# Patient Record
Sex: MALE | Race: WHITE
[De-identification: names, ages, dates, MRNs, and addresses within clinical notes are randomized per-mention and may not be internally consistent; named-entity substitution may affect disease eponyms.]

---

## 2018-03-13 ENCOUNTER — HOSPITAL ENCOUNTER (OUTPATIENT)
Age: 38
End: 2018-03-13
Payer: COMMERCIAL

## 2018-03-13 DIAGNOSIS — G35: Primary | ICD-10-CM

## 2018-03-13 DIAGNOSIS — Z79.899: ICD-10-CM

## 2018-03-13 LAB
ALANINE AMINOTRANSFER ALT/SGPT: 38 U/L (ref 16–61)
ALBUMIN SERPL-MCNC: 3.7 G/DL (ref 3.2–5)
ALKALINE PHOSPHATASE: 57 U/L (ref 45–117)
ANION GAP: 7 (ref 5–15)
AST(SGOT): 25 U/L (ref 15–37)
BUN SERPL-MCNC: 9 MG/DL (ref 7–18)
BUN/CREAT RATIO: 10.2 RATIO (ref 10–20)
CALCIUM SERPL-MCNC: 8.7 MG/DL (ref 8.5–10.1)
CARBON DIOXIDE: 29 MMOL/L (ref 21–32)
CHLORIDE: 103 MMOL/L (ref 98–107)
DEPRECATED RDW RBC: 44.4 FL (ref 35.1–43.9)
DIFFERENTIAL INDICATED: (no result)
ERYTHROCYTE [DISTWIDTH] IN BLOOD: 12.9 % (ref 11.6–14.6)
EST GLOM FILT RATE - AFR AMER: 125 ML/MIN (ref 60–?)
GLOBULIN: 3.4 G/DL (ref 2.2–4.2)
GLUCOSE: 88 MG/DL (ref 74–106)
HCT VFR BLD AUTO: 42.8 % (ref 40–54)
HEMOGLOBIN: 14.4 G/DL (ref 13–16.5)
HGB BLD-MCNC: 14.4 G/DL (ref 13–16.5)
IMMATURE GRANULOCYTES COUNT: 0 X10^3/UL (ref 0–0)
MCV RBC: 96.4 FL (ref 80–94)
MEAN CORP HGB CONC: 33.6 G/GL (ref 32–36)
MEAN PLATELET VOL.: 9.5 FL (ref 6.2–12)
PLATELET # BLD: 302 K/MM3 (ref 150–450)
PLATELET COUNT: 302 K/MM3 (ref 150–450)
POSITIVE COUNT: NO
POSITIVE DIFFERENTIAL: NO
POSITIVE MORPHOLOGY: NO
POTASSIUM: 3.8 MMOL/L (ref 3.5–5.1)
RBC # BLD AUTO: 4.44 M/MM3 (ref 4.6–6.2)
RBC DISTRIBUTION WIDTH CV: 12.9 % (ref 11.6–14.6)
RBC DISTRIBUTION WIDTH SD: 44.4 FL (ref 35.1–43.9)
WBC # BLD AUTO: 6.3 K/MM3 (ref 4.4–11)
WHITE BLOOD COUNT: 6.3 K/MM3 (ref 4.4–11)

## 2018-03-13 PROCEDURE — 70553 MRI BRAIN STEM W/O & W/DYE: CPT

## 2018-03-13 PROCEDURE — 85025 COMPLETE CBC W/AUTO DIFF WBC: CPT

## 2018-03-13 PROCEDURE — A9585 GADOBUTROL INJECTION: HCPCS

## 2018-03-13 PROCEDURE — 36415 COLL VENOUS BLD VENIPUNCTURE: CPT

## 2018-03-13 PROCEDURE — 80053 COMPREHEN METABOLIC PANEL: CPT

## 2019-05-02 ENCOUNTER — HOSPITAL ENCOUNTER (OUTPATIENT)
Dept: HOSPITAL 100 - LAB | Age: 39
Discharge: HOME | End: 2019-05-02
Payer: COMMERCIAL

## 2019-05-02 VITALS — BODY MASS INDEX: 24.7 KG/M2

## 2019-05-02 DIAGNOSIS — G35: Primary | ICD-10-CM

## 2019-05-02 LAB — POTASSIUM: 3.8 MMOL/L (ref 3.5–5.1)

## 2019-05-02 PROCEDURE — 84132 ASSAY OF SERUM POTASSIUM: CPT

## 2019-05-02 PROCEDURE — 36415 COLL VENOUS BLD VENIPUNCTURE: CPT

## 2020-07-15 ENCOUNTER — HOSPITAL ENCOUNTER (OUTPATIENT)
Dept: HOSPITAL 100 - LAB | Age: 40
Discharge: HOME | End: 2020-07-15
Payer: COMMERCIAL

## 2020-07-15 DIAGNOSIS — Z79.899: Primary | ICD-10-CM

## 2020-07-15 LAB
ALANINE AMINOTRANSFER ALT/SGPT: 34 U/L (ref 16–61)
ALBUMIN SERPL-MCNC: 3.8 G/DL (ref 3.2–5)
ALKALINE PHOSPHATASE: 52 U/L (ref 45–117)
ANION GAP: 6 (ref 5–15)
AST(SGOT): 18 U/L (ref 15–37)
BUN SERPL-MCNC: 10 MG/DL (ref 7–18)
BUN/CREAT RATIO: 10.5 RATIO (ref 10–20)
CALCIUM SERPL-MCNC: 8.7 MG/DL (ref 8.5–10.1)
CARBON DIOXIDE: 28 MMOL/L (ref 21–32)
CHLORIDE: 104 MMOL/L (ref 98–107)
DEPRECATED RDW RBC: 41.7 FL (ref 35.1–43.9)
ERYTHROCYTE [DISTWIDTH] IN BLOOD: 12.4 % (ref 11.6–14.6)
EST GLOM FILT RATE - AFR AMER: 113 ML/MIN (ref 60–?)
GLOBULIN: 3.3 G/DL (ref 2.2–4.2)
GLUCOSE: 96 MG/DL (ref 74–106)
HCT VFR BLD AUTO: 44 % (ref 40–54)
HEMOGLOBIN: 15.2 G/DL (ref 13–16.5)
HGB BLD-MCNC: 15.2 G/DL (ref 13–16.5)
IMMATURE GRANULOCYTES COUNT: 0.01 X10^3/UL (ref 0–0)
MCV RBC: 91.9 FL (ref 80–94)
MEAN CORP HGB CONC: 34.5 G/DL (ref 32–36)
MEAN PLATELET VOL.: 9.9 FL (ref 6.2–12)
NRBC FLAGGED BY ANALYZER: 0 % (ref 0–5)
PLATELET # BLD: 337 K/MM3 (ref 150–450)
PLATELET COUNT: 337 K/MM3 (ref 150–450)
POTASSIUM: 3.7 MMOL/L (ref 3.5–5.1)
RBC # BLD AUTO: 4.79 M/MM3 (ref 4.6–6.2)
RBC DISTRIBUTION WIDTH CV: 12.4 % (ref 11.6–14.6)
RBC DISTRIBUTION WIDTH SD: 41.7 FL (ref 35.1–43.9)
WBC # BLD AUTO: 7.2 K/MM3 (ref 4.4–11)
WHITE BLOOD COUNT: 7.2 K/MM3 (ref 4.4–11)

## 2020-07-15 PROCEDURE — 80053 COMPREHEN METABOLIC PANEL: CPT

## 2020-07-15 PROCEDURE — 36415 COLL VENOUS BLD VENIPUNCTURE: CPT

## 2020-07-15 PROCEDURE — 85025 COMPLETE CBC W/AUTO DIFF WBC: CPT

## 2020-09-21 ENCOUNTER — HOSPITAL ENCOUNTER (OUTPATIENT)
Dept: HOSPITAL 100 - LAB | Age: 40
Discharge: HOME | End: 2020-09-21
Payer: COMMERCIAL

## 2020-09-21 VITALS — BODY MASS INDEX: 24.7 KG/M2

## 2020-09-21 DIAGNOSIS — Z79.899: Primary | ICD-10-CM

## 2020-09-21 LAB
ALANINE AMINOTRANSFER ALT/SGPT: 32 U/L (ref 16–61)
ALBUMIN SERPL-MCNC: 3.8 G/DL (ref 3.2–5)
ALKALINE PHOSPHATASE: 51 U/L (ref 45–117)
ANION GAP: 5 (ref 5–15)
AST(SGOT): 20 U/L (ref 15–37)
BUN SERPL-MCNC: 10 MG/DL (ref 7–18)
BUN/CREAT RATIO: 11 RATIO (ref 10–20)
CALCIUM SERPL-MCNC: 9 MG/DL (ref 8.5–10.1)
CARBON DIOXIDE: 29 MMOL/L (ref 21–32)
CHLORIDE: 105 MMOL/L (ref 98–107)
DEPRECATED RDW RBC: 41.9 FL (ref 35.1–43.9)
ERYTHROCYTE [DISTWIDTH] IN BLOOD: 12.1 % (ref 11.6–14.6)
EST GLOM FILT RATE - AFR AMER: 119 ML/MIN (ref 60–?)
GLOBULIN: 3.4 G/DL (ref 2.2–4.2)
GLUCOSE: 100 MG/DL (ref 74–106)
HCT VFR BLD AUTO: 44.4 % (ref 40–54)
HEMOGLOBIN: 15 G/DL (ref 13–16.5)
HGB BLD-MCNC: 15 G/DL (ref 13–16.5)
IMMATURE GRANULOCYTES COUNT: 0.02 X10^3/UL (ref 0–0)
MCV RBC: 93.3 FL (ref 80–94)
MEAN CORP HGB CONC: 33.8 G/DL (ref 32–36)
MEAN PLATELET VOL.: 10.1 FL (ref 6.2–12)
NRBC FLAGGED BY ANALYZER: 0 % (ref 0–5)
PLATELET # BLD: 336 K/MM3 (ref 150–450)
PLATELET COUNT: 336 K/MM3 (ref 150–450)
POTASSIUM: 3.6 MMOL/L (ref 3.5–5.1)
RBC # BLD AUTO: 4.76 M/MM3 (ref 4.6–6.2)
RBC DISTRIBUTION WIDTH CV: 12.1 % (ref 11.6–14.6)
RBC DISTRIBUTION WIDTH SD: 41.9 FL (ref 35.1–43.9)
WBC # BLD AUTO: 7.1 K/MM3 (ref 4.4–11)
WHITE BLOOD COUNT: 7.1 K/MM3 (ref 4.4–11)

## 2020-09-21 PROCEDURE — 36415 COLL VENOUS BLD VENIPUNCTURE: CPT

## 2020-09-21 PROCEDURE — 85025 COMPLETE CBC W/AUTO DIFF WBC: CPT

## 2020-09-21 PROCEDURE — 80053 COMPREHEN METABOLIC PANEL: CPT

## 2021-03-11 ENCOUNTER — HOSPITAL ENCOUNTER (OUTPATIENT)
Age: 41
End: 2021-03-11
Payer: COMMERCIAL

## 2021-03-11 VITALS — BODY MASS INDEX: 24.7 KG/M2

## 2021-03-11 DIAGNOSIS — Z79.899: Primary | ICD-10-CM

## 2021-03-11 LAB
ALANINE AMINOTRANSFER ALT/SGPT: 30 U/L (ref 16–61)
ALBUMIN SERPL-MCNC: 3.9 G/DL (ref 3.2–5)
ALKALINE PHOSPHATASE: 57 U/L (ref 45–117)
ANION GAP: 8 (ref 5–15)
AST(SGOT): 22 U/L (ref 15–37)
BUN SERPL-MCNC: 12 MG/DL (ref 7–18)
BUN/CREAT RATIO: 12.6 RATIO (ref 10–20)
CALCIUM SERPL-MCNC: 9.2 MG/DL (ref 8.5–10.1)
CARBON DIOXIDE: 28 MMOL/L (ref 21–32)
CHLORIDE: 104 MMOL/L (ref 98–107)
DEPRECATED RDW RBC: 43 FL (ref 35.1–43.9)
ERYTHROCYTE [DISTWIDTH] IN BLOOD: 12.5 % (ref 11.6–14.6)
EST GLOM FILT RATE - AFR AMER: 111 ML/MIN (ref 60–?)
GLOBULIN: 3.3 G/DL (ref 2.2–4.2)
GLUCOSE: 80 MG/DL (ref 74–106)
HCT VFR BLD AUTO: 43.8 % (ref 40–54)
HEMOGLOBIN: 15.1 G/DL (ref 13–16.5)
HGB BLD-MCNC: 15.1 G/DL (ref 13–16.5)
IMMATURE GRANULOCYTES COUNT: 0.02 X10^3/UL (ref 0–0)
MCV RBC: 92.8 FL (ref 80–94)
MEAN CORP HGB CONC: 34.5 G/DL (ref 32–36)
MEAN PLATELET VOL.: 10.2 FL (ref 6.2–12)
NRBC FLAGGED BY ANALYZER: 0 % (ref 0–5)
PLATELET # BLD: 348 K/MM3 (ref 150–450)
PLATELET COUNT: 348 K/MM3 (ref 150–450)
POTASSIUM: 3.6 MMOL/L (ref 3.5–5.1)
RBC # BLD AUTO: 4.72 M/MM3 (ref 4.6–6.2)
RBC DISTRIBUTION WIDTH CV: 12.5 % (ref 11.6–14.6)
RBC DISTRIBUTION WIDTH SD: 43 FL (ref 35.1–43.9)
WBC # BLD AUTO: 7.3 K/MM3 (ref 4.4–11)
WHITE BLOOD COUNT: 7.3 K/MM3 (ref 4.4–11)

## 2021-03-11 PROCEDURE — 82784 ASSAY IGA/IGD/IGG/IGM EACH: CPT

## 2021-03-11 PROCEDURE — 82785 ASSAY OF IGE: CPT

## 2021-03-11 PROCEDURE — 85025 COMPLETE CBC W/AUTO DIFF WBC: CPT

## 2021-03-11 PROCEDURE — 36415 COLL VENOUS BLD VENIPUNCTURE: CPT

## 2021-03-11 PROCEDURE — 80053 COMPREHEN METABOLIC PANEL: CPT

## 2021-03-15 LAB
IGA SERPL-MCNC: 123 MG/DL (ref 90–386)
IGG SERPL-MCNC: 979 MG/DL (ref 603–1613)
IGM SERPL-MCNC: 127 MG/DL (ref 20–172)
IMMUNOGLOBULIN A: 123 MG/DL (ref 90–386)
IMMUNOGLOBULIN G: 979 MG/DL (ref 603–1613)
IMMUNOGLOBULIN M: 127 MG/DL (ref 20–172)

## 2021-08-30 ENCOUNTER — HOSPITAL ENCOUNTER (OUTPATIENT)
Age: 41
End: 2021-08-30
Payer: COMMERCIAL

## 2021-08-30 DIAGNOSIS — Z79.899: Primary | ICD-10-CM

## 2021-08-30 LAB
ALANINE AMINOTRANSFER ALT/SGPT: 29 U/L (ref 16–61)
ALBUMIN SERPL-MCNC: 4 G/DL (ref 3.2–5)
ALKALINE PHOSPHATASE: 51 U/L (ref 45–117)
ANION GAP: 8 (ref 5–15)
AST(SGOT): 17 U/L (ref 15–37)
BUN SERPL-MCNC: 13 MG/DL (ref 7–18)
BUN/CREAT RATIO: 14 RATIO (ref 10–20)
CALCIUM SERPL-MCNC: 9.3 MG/DL (ref 8.5–10.1)
CARBON DIOXIDE: 25 MMOL/L (ref 21–32)
CHLORIDE: 108 MMOL/L (ref 98–107)
DEPRECATED RDW RBC: 43.7 FL (ref 35.1–43.9)
ERYTHROCYTE [DISTWIDTH] IN BLOOD: 12.7 % (ref 11.6–14.6)
EST GLOM FILT RATE - AFR AMER: 115 ML/MIN (ref 60–?)
GLOBULIN: 3.2 G/DL (ref 2.2–4.2)
GLUCOSE: 95 MG/DL (ref 74–106)
HCT VFR BLD AUTO: 43.7 % (ref 40–54)
HEMOGLOBIN: 14.6 G/DL (ref 13–16.5)
HGB BLD-MCNC: 14.6 G/DL (ref 13–16.5)
IMMATURE GRANULOCYTES COUNT: 0.02 X10^3/UL (ref 0–0)
MCV RBC: 93.8 FL (ref 80–94)
MEAN CORP HGB CONC: 33.4 G/DL (ref 32–36)
MEAN PLATELET VOL.: 10.1 FL (ref 6.2–12)
NRBC FLAGGED BY ANALYZER: 0 % (ref 0–5)
PLATELET # BLD: 348 K/MM3 (ref 150–450)
PLATELET COUNT: 348 K/MM3 (ref 150–450)
POTASSIUM: 3.6 MMOL/L (ref 3.5–5.1)
RBC # BLD AUTO: 4.66 M/MM3 (ref 4.6–6.2)
RBC DISTRIBUTION WIDTH CV: 12.7 % (ref 11.6–14.6)
RBC DISTRIBUTION WIDTH SD: 43.7 FL (ref 35.1–43.9)
WBC # BLD AUTO: 7.4 K/MM3 (ref 4.4–11)
WHITE BLOOD COUNT: 7.4 K/MM3 (ref 4.4–11)

## 2021-08-30 PROCEDURE — 85025 COMPLETE CBC W/AUTO DIFF WBC: CPT

## 2021-08-30 PROCEDURE — 80053 COMPREHEN METABOLIC PANEL: CPT

## 2021-08-30 PROCEDURE — 82784 ASSAY IGA/IGD/IGG/IGM EACH: CPT

## 2021-08-30 PROCEDURE — 36415 COLL VENOUS BLD VENIPUNCTURE: CPT

## 2021-09-01 LAB
IGA SERPL-MCNC: 121 MG/DL (ref 90–386)
IGG SERPL-MCNC: 983 MG/DL (ref 603–1613)
IGM SERPL-MCNC: 115 MG/DL (ref 20–172)
IMMUNOGLOBULIN A: 121 MG/DL (ref 90–386)
IMMUNOGLOBULIN G: 983 MG/DL (ref 603–1613)
IMMUNOGLOBULIN M: 115 MG/DL (ref 20–172)

## 2022-02-14 ENCOUNTER — HOSPITAL ENCOUNTER (OUTPATIENT)
Dept: HOSPITAL 100 - LAB | Age: 42
Discharge: HOME | End: 2022-02-14
Payer: COMMERCIAL

## 2022-02-14 DIAGNOSIS — Z79.899: Primary | ICD-10-CM

## 2022-02-14 LAB
ALANINE AMINOTRANSFER ALT/SGPT: 24 U/L (ref 16–61)
ALBUMIN SERPL-MCNC: 3.9 G/DL (ref 3.2–5)
ALKALINE PHOSPHATASE: 59 U/L (ref 45–117)
ANION GAP: 4 (ref 5–15)
AST(SGOT): 16 U/L (ref 15–37)
BUN SERPL-MCNC: 13 MG/DL (ref 7–18)
BUN/CREAT RATIO: 12.6 RATIO (ref 10–20)
CALCIUM SERPL-MCNC: 9.2 MG/DL (ref 8.5–10.1)
CARBON DIOXIDE: 29 MMOL/L (ref 21–32)
CHLORIDE: 106 MMOL/L (ref 98–107)
DEPRECATED RDW RBC: 43.9 FL (ref 35.1–43.9)
ERYTHROCYTE [DISTWIDTH] IN BLOOD: 12.8 % (ref 11.6–14.6)
EST GLOM FILT RATE - AFR AMER: 102 ML/MIN (ref 60–?)
GLOBULIN: 3.3 G/DL (ref 2.2–4.2)
GLUCOSE: 102 MG/DL (ref 74–106)
HCT VFR BLD AUTO: 43.4 % (ref 40–54)
HEMOGLOBIN: 15 G/DL (ref 13–16.5)
HGB BLD-MCNC: 15 G/DL (ref 13–16.5)
IMMATURE GRANULOCYTES COUNT: 0.03 X10^3/UL (ref 0–0)
MCV RBC: 92.7 FL (ref 80–94)
MEAN CORP HGB CONC: 34.6 G/DL (ref 32–36)
MEAN PLATELET VOL.: 10 FL (ref 6.2–12)
NRBC FLAGGED BY ANALYZER: 0 % (ref 0–5)
PLATELET # BLD: 341 K/MM3 (ref 150–450)
PLATELET COUNT: 341 K/MM3 (ref 150–450)
POTASSIUM: 3.8 MMOL/L (ref 3.5–5.1)
RBC # BLD AUTO: 4.68 M/MM3 (ref 4.6–6.2)
RBC DISTRIBUTION WIDTH CV: 12.8 % (ref 11.6–14.6)
RBC DISTRIBUTION WIDTH SD: 43.9 FL (ref 35.1–43.9)
WBC # BLD AUTO: 7.8 K/MM3 (ref 4.4–11)
WHITE BLOOD COUNT: 7.8 K/MM3 (ref 4.4–11)

## 2022-02-14 PROCEDURE — 36415 COLL VENOUS BLD VENIPUNCTURE: CPT

## 2022-02-14 PROCEDURE — 80053 COMPREHEN METABOLIC PANEL: CPT

## 2022-02-14 PROCEDURE — 82784 ASSAY IGA/IGD/IGG/IGM EACH: CPT

## 2022-02-14 PROCEDURE — 85025 COMPLETE CBC W/AUTO DIFF WBC: CPT

## 2022-02-16 LAB
IGA SERPL-MCNC: 126 MG/DL (ref 90–386)
IGG SERPL-MCNC: 963 MG/DL (ref 603–1613)
IGM SERPL-MCNC: 116 MG/DL (ref 20–172)
IMMUNOGLOBULIN A: 126 MG/DL (ref 90–386)
IMMUNOGLOBULIN G: 963 MG/DL (ref 603–1613)
IMMUNOGLOBULIN M: 116 MG/DL (ref 20–172)

## 2022-08-11 ENCOUNTER — HOSPITAL ENCOUNTER (OUTPATIENT)
Dept: HOSPITAL 100 - LAB | Age: 42
Discharge: HOME | End: 2022-08-11
Payer: COMMERCIAL

## 2022-08-11 DIAGNOSIS — Z79.899: Primary | ICD-10-CM

## 2022-08-11 LAB
ALANINE AMINOTRANSFER ALT/SGPT: 27 U/L (ref 16–61)
ALBUMIN SERPL-MCNC: 4 G/DL (ref 3.2–5)
ALKALINE PHOSPHATASE: 50 U/L (ref 45–117)
ANION GAP: 7 (ref 5–15)
AST(SGOT): 20 U/L (ref 15–37)
BUN SERPL-MCNC: 18 MG/DL (ref 7–18)
BUN/CREAT RATIO: 17.5 RATIO (ref 10–20)
CALCIUM SERPL-MCNC: 9.5 MG/DL (ref 8.5–10.1)
CARBON DIOXIDE: 27 MMOL/L (ref 21–32)
CHLORIDE: 104 MMOL/L (ref 98–107)
DEPRECATED RDW RBC: 43.5 FL (ref 35.1–43.9)
ERYTHROCYTE [DISTWIDTH] IN BLOOD: 12.6 % (ref 11.6–14.6)
EST GLOM FILT RATE - AFR AMER: 101 ML/MIN (ref 60–?)
GLOBULIN: 3 G/DL (ref 2.2–4.2)
GLUCOSE: 91 MG/DL (ref 74–106)
HCT VFR BLD AUTO: 43.8 % (ref 40–54)
HEMOGLOBIN: 14.9 G/DL (ref 13–16.5)
HGB BLD-MCNC: 14.9 G/DL (ref 13–16.5)
IMMATURE GRANULOCYTES COUNT: 0.02 X10^3/UL (ref 0–0)
MCV RBC: 93.6 FL (ref 80–94)
MEAN CORP HGB CONC: 34 G/DL (ref 32–36)
MEAN PLATELET VOL.: 10.1 FL (ref 6.2–12)
NRBC FLAGGED BY ANALYZER: 0 % (ref 0–5)
PLATELET # BLD: 318 K/MM3 (ref 150–450)
PLATELET COUNT: 318 K/MM3 (ref 150–450)
POTASSIUM: 3.8 MMOL/L (ref 3.5–5.1)
RBC # BLD AUTO: 4.68 M/MM3 (ref 4.6–6.2)
RBC DISTRIBUTION WIDTH CV: 12.6 % (ref 11.6–14.6)
RBC DISTRIBUTION WIDTH SD: 43.5 FL (ref 35.1–43.9)
WBC # BLD AUTO: 7.4 K/MM3 (ref 4.4–11)
WHITE BLOOD COUNT: 7.4 K/MM3 (ref 4.4–11)

## 2022-08-11 PROCEDURE — 80053 COMPREHEN METABOLIC PANEL: CPT

## 2022-08-11 PROCEDURE — 85025 COMPLETE CBC W/AUTO DIFF WBC: CPT

## 2022-08-11 PROCEDURE — 82784 ASSAY IGA/IGD/IGG/IGM EACH: CPT

## 2022-08-11 PROCEDURE — 36415 COLL VENOUS BLD VENIPUNCTURE: CPT

## 2022-08-13 LAB
IGA SERPL-MCNC: 124 MG/DL (ref 90–386)
IGG SERPL-MCNC: 967 MG/DL (ref 603–1613)
IGM SERPL-MCNC: 116 MG/DL (ref 20–172)
IMMUNOGLOBULIN A: 124 MG/DL (ref 90–386)
IMMUNOGLOBULIN G: 967 MG/DL (ref 603–1613)
IMMUNOGLOBULIN M: 116 MG/DL (ref 20–172)

## 2023-01-18 ENCOUNTER — HOSPITAL ENCOUNTER (OUTPATIENT)
Dept: HOSPITAL 100 - LAB | Age: 43
Discharge: HOME | End: 2023-01-18
Payer: COMMERCIAL

## 2023-01-18 DIAGNOSIS — Z79.899: Primary | ICD-10-CM

## 2023-01-18 LAB
ALANINE AMINOTRANSFER ALT/SGPT: 25 U/L (ref 16–61)
ALBUMIN SERPL-MCNC: 4.1 G/DL (ref 3.2–5)
ALKALINE PHOSPHATASE: 48 U/L (ref 45–117)
ANION GAP: 5 (ref 5–15)
AST(SGOT): 19 U/L (ref 15–37)
BUN SERPL-MCNC: 14 MG/DL (ref 7–18)
BUN/CREAT RATIO: 13.7 RATIO (ref 10–20)
CALCIUM SERPL-MCNC: 9.5 MG/DL (ref 8.5–10.1)
CARBON DIOXIDE: 30 MMOL/L (ref 21–32)
CHLORIDE: 103 MMOL/L (ref 98–107)
DEPRECATED RDW RBC: 44 FL (ref 35.1–43.9)
ERYTHROCYTE [DISTWIDTH] IN BLOOD: 12.8 % (ref 11.6–14.6)
EST GLOM FILT RATE - AFR AMER: 102 ML/MIN (ref 60–?)
GLOBULIN: 3.2 G/DL (ref 2.2–4.2)
GLUCOSE: 101 MG/DL (ref 74–106)
HCT VFR BLD AUTO: 43.4 % (ref 40–54)
HEMOGLOBIN: 14.5 G/DL (ref 13–16.5)
HGB BLD-MCNC: 14.5 G/DL (ref 13–16.5)
IMMATURE GRANULOCYTES COUNT: 0.02 X10^3/UL (ref 0–0)
MCV RBC: 93.9 FL (ref 80–94)
MEAN CORP HGB CONC: 33.4 G/DL (ref 32–36)
MEAN PLATELET VOL.: 9.4 FL (ref 6.2–12)
NRBC FLAGGED BY ANALYZER: 0 % (ref 0–5)
PLATELET # BLD: 330 K/MM3 (ref 150–450)
PLATELET COUNT: 330 K/MM3 (ref 150–450)
POTASSIUM: 4.1 MMOL/L (ref 3.5–5.1)
RBC # BLD AUTO: 4.62 M/MM3 (ref 4.6–6.2)
RBC DISTRIBUTION WIDTH CV: 12.8 % (ref 11.6–14.6)
RBC DISTRIBUTION WIDTH SD: 44 FL (ref 35.1–43.9)
WBC # BLD AUTO: 6.6 K/MM3 (ref 4.4–11)
WHITE BLOOD COUNT: 6.6 K/MM3 (ref 4.4–11)

## 2023-01-18 PROCEDURE — 80053 COMPREHEN METABOLIC PANEL: CPT

## 2023-01-18 PROCEDURE — 82784 ASSAY IGA/IGD/IGG/IGM EACH: CPT

## 2023-01-18 PROCEDURE — 36415 COLL VENOUS BLD VENIPUNCTURE: CPT

## 2023-01-18 PROCEDURE — 85025 COMPLETE CBC W/AUTO DIFF WBC: CPT

## 2023-01-20 LAB
IGA SERPL-MCNC: 122 MG/DL (ref 90–386)
IGG SERPL-MCNC: 984 MG/DL (ref 603–1613)
IGM SERPL-MCNC: 108 MG/DL (ref 20–172)
IMMUNOGLOBULIN A: 122 MG/DL (ref 90–386)
IMMUNOGLOBULIN G: 984 MG/DL (ref 603–1613)
IMMUNOGLOBULIN M: 108 MG/DL (ref 20–172)

## 2023-07-25 ENCOUNTER — HOSPITAL ENCOUNTER (OUTPATIENT)
Age: 43
Discharge: HOME | End: 2023-07-25
Payer: COMMERCIAL

## 2023-07-25 DIAGNOSIS — Z79.899: Primary | ICD-10-CM

## 2023-07-25 LAB
ALANINE AMINOTRANSFER ALT/SGPT: 22 U/L (ref 16–61)
ALBUMIN SERPL-MCNC: 4.1 G/DL (ref 3.2–5)
ALKALINE PHOSPHATASE: 52 U/L (ref 45–117)
ANION GAP: 4 (ref 5–15)
AST(SGOT): 20 U/L (ref 15–37)
BUN SERPL-MCNC: 15 MG/DL (ref 7–18)
BUN/CREAT RATIO: 13.3 RATIO (ref 10–20)
CALCIUM SERPL-MCNC: 9.4 MG/DL (ref 8.5–10.1)
CARBON DIOXIDE: 28 MMOL/L (ref 21–32)
CHLORIDE: 108 MMOL/L (ref 98–107)
DEPRECATED RDW RBC: 43.3 FL (ref 35.1–43.9)
ERYTHROCYTE [DISTWIDTH] IN BLOOD: 12.5 % (ref 11.6–14.6)
EST GLOM FILT RATE - AFR AMER: 91 ML/MIN (ref 60–?)
GLOBULIN: 3 G/DL (ref 2.2–4.2)
GLUCOSE: 96 MG/DL (ref 74–106)
HCT VFR BLD AUTO: 42.9 % (ref 40–54)
HEMOGLOBIN: 14.4 G/DL (ref 13–16.5)
HGB BLD-MCNC: 14.4 G/DL (ref 13–16.5)
IMMATURE GRANULOCYTES COUNT: 0.02 X10^3/UL (ref 0–0)
MCV RBC: 94.1 FL (ref 80–94)
MEAN CORP HGB CONC: 33.6 G/DL (ref 32–36)
MEAN PLATELET VOL.: 9.5 FL (ref 6.2–12)
NRBC FLAGGED BY ANALYZER: 0 % (ref 0–5)
PLATELET # BLD: 301 K/MM3 (ref 150–450)
PLATELET COUNT: 301 K/MM3 (ref 150–450)
POTASSIUM: 3.9 MMOL/L (ref 3.5–5.1)
RBC # BLD AUTO: 4.56 M/MM3 (ref 4.6–6.2)
RBC DISTRIBUTION WIDTH CV: 12.5 % (ref 11.6–14.6)
RBC DISTRIBUTION WIDTH SD: 43.3 FL (ref 35.1–43.9)
WBC # BLD AUTO: 7.8 K/MM3 (ref 4.4–11)
WHITE BLOOD COUNT: 7.8 K/MM3 (ref 4.4–11)

## 2023-07-25 PROCEDURE — 85025 COMPLETE CBC W/AUTO DIFF WBC: CPT

## 2023-07-25 PROCEDURE — 36415 COLL VENOUS BLD VENIPUNCTURE: CPT

## 2023-07-25 PROCEDURE — 80053 COMPREHEN METABOLIC PANEL: CPT

## 2023-07-25 PROCEDURE — 82784 ASSAY IGA/IGD/IGG/IGM EACH: CPT

## 2023-07-25 PROCEDURE — 82785 ASSAY OF IGE: CPT

## 2023-07-29 LAB
IGA SERPL-MCNC: 116 MG/DL (ref 90–386)
IGG SERPL-MCNC: 1012 MG/DL (ref 603–1613)
IGM SERPL-MCNC: 110 MG/DL (ref 20–172)
IMMUNOGLOBULIN A: 116 MG/DL (ref 90–386)
IMMUNOGLOBULIN G: 1012 MG/DL (ref 603–1613)
IMMUNOGLOBULIN M: 110 MG/DL (ref 20–172)

## 2024-01-22 ENCOUNTER — HOSPITAL ENCOUNTER (OUTPATIENT)
Age: 44
Discharge: HOME | End: 2024-01-22
Payer: COMMERCIAL

## 2024-01-22 DIAGNOSIS — Z79.899: Primary | ICD-10-CM

## 2024-01-22 LAB
ALANINE AMINOTRANSFER ALT/SGPT: 20 U/L (ref 16–61)
ALBUMIN SERPL-MCNC: 4 G/DL (ref 3.2–5)
ALKALINE PHOSPHATASE: 53 U/L (ref 45–117)
ANION GAP: 4 (ref 5–15)
AST(SGOT): 25 U/L (ref 15–37)
BUN SERPL-MCNC: 7 MG/DL (ref 7–18)
BUN/CREAT RATIO: 7.8 RATIO (ref 10–20)
CALCIUM SERPL-MCNC: 9.8 MG/DL (ref 8.5–10.1)
CARBON DIOXIDE: 30 MMOL/L (ref 21–32)
CHLORIDE: 106 MMOL/L (ref 98–107)
DEPRECATED RDW RBC: 44.3 FL (ref 35.1–43.9)
ERYTHROCYTE [DISTWIDTH] IN BLOOD: 12.8 % (ref 11.6–14.6)
EST GLOM FILT RATE - AFR AMER: 117 ML/MIN (ref 60–?)
GLOBULIN: 3 G/DL (ref 2.2–4.2)
GLUCOSE: 87 MG/DL (ref 74–106)
HCT VFR BLD AUTO: 42.4 % (ref 40–54)
HGB BLD-MCNC: 14 G/DL (ref 13–16.5)
IMMATURE GRANULOCYTES COUNT: 0.02 X10^3/UL (ref 0–0)
MCV RBC: 94.2 FL (ref 80–94)
MEAN CORP HGB CONC: 33 G/DL (ref 32–36)
MEAN PLATELET VOL.: 10.2 FL (ref 6.2–12)
NRBC FLAGGED BY ANALYZER: 0 % (ref 0–5)
PLATELET # BLD: 348 K/MM3 (ref 150–450)
POTASSIUM: 4.3 MMOL/L (ref 3.5–5.1)
RBC # BLD AUTO: 4.5 M/MM3 (ref 4.6–6.2)
WBC # BLD AUTO: 8.2 K/MM3 (ref 4.4–11)

## 2024-01-22 PROCEDURE — 80053 COMPREHEN METABOLIC PANEL: CPT

## 2024-01-22 PROCEDURE — 36415 COLL VENOUS BLD VENIPUNCTURE: CPT

## 2024-01-22 PROCEDURE — 85025 COMPLETE CBC W/AUTO DIFF WBC: CPT

## 2024-01-22 PROCEDURE — 82785 ASSAY OF IGE: CPT

## 2024-01-22 PROCEDURE — 82784 ASSAY IGA/IGD/IGG/IGM EACH: CPT

## 2024-01-26 LAB
IGA SERPL-MCNC: 125 MG/DL (ref 90–386)
IGG SERPL-MCNC: 950 MG/DL (ref 603–1613)
IGM SERPL-MCNC: 100 MG/DL (ref 20–172)

## 2024-06-07 ENCOUNTER — HOSPITAL ENCOUNTER (OUTPATIENT)
Age: 44
Discharge: HOME | End: 2024-06-07
Payer: COMMERCIAL

## 2024-06-07 DIAGNOSIS — G35: Primary | ICD-10-CM

## 2024-06-07 PROCEDURE — A9575 INJ GADOTERATE MEGLUMI 0.1ML: HCPCS

## 2024-06-07 PROCEDURE — 70553 MRI BRAIN STEM W/O & W/DYE: CPT

## 2024-09-24 ENCOUNTER — HOSPITAL ENCOUNTER (OUTPATIENT)
Dept: HOSPITAL 100 - LAB | Age: 44
Discharge: HOME | End: 2024-09-24
Payer: COMMERCIAL

## 2024-09-24 DIAGNOSIS — Z79.899: Primary | ICD-10-CM

## 2024-09-24 LAB
ALANINE AMINOTRANSFER ALT/SGPT: 23 U/L (ref 16–61)
ALBUMIN SERPL-MCNC: 3.8 G/DL (ref 3.2–5)
ALKALINE PHOSPHATASE: 53 U/L (ref 45–117)
ANION GAP: 5 (ref 5–15)
AST(SGOT): 24 U/L (ref 15–37)
BUN SERPL-MCNC: 13 MG/DL (ref 7–18)
BUN/CREAT RATIO: 14.1 RATIO (ref 10–20)
CALCIUM SERPL-MCNC: 9.6 MG/DL (ref 8.5–10.1)
CARBON DIOXIDE: 27 MMOL/L (ref 21–32)
CHLORIDE: 106 MMOL/L (ref 98–107)
DEPRECATED RDW RBC: 42.6 FL (ref 35.1–43.9)
ERYTHROCYTE [DISTWIDTH] IN BLOOD: 12.5 % (ref 11.6–14.6)
EST GLOM FILT RATE - AFR AMER: 114 ML/MIN (ref 60–?)
GLOBULIN: 3.2 G/DL (ref 2.2–4.2)
GLUCOSE: 92 MG/DL (ref 74–106)
HCT VFR BLD AUTO: 41.2 % (ref 40–54)
HEMOGLOBIN: 13.9 G/DL (ref 13–16.5)
HGB BLD-MCNC: 13.9 G/DL (ref 13–16.5)
IMMATURE GRANULOCYTES COUNT: 0.02 X10^3/UL (ref 0–0)
MCV RBC: 92.8 FL (ref 80–94)
MEAN CORP HGB CONC: 33.7 G/DL (ref 32–36)
MEAN PLATELET VOL.: 9.4 FL (ref 6.2–12)
NRBC FLAGGED BY ANALYZER: 0 % (ref 0–5)
PLATELET # BLD: 304 K/MM3 (ref 150–450)
PLATELET COUNT: 304 K/MM3 (ref 150–450)
POTASSIUM: 4 MMOL/L (ref 3.5–5.1)
RBC # BLD AUTO: 4.44 M/MM3 (ref 4.6–6.2)
RBC DISTRIBUTION WIDTH CV: 12.5 % (ref 11.6–14.6)
RBC DISTRIBUTION WIDTH SD: 42.6 FL (ref 35.1–43.9)
WBC # BLD AUTO: 7.9 K/MM3 (ref 4.4–11)
WHITE BLOOD COUNT: 7.9 K/MM3 (ref 4.4–11)

## 2024-09-24 PROCEDURE — 82784 ASSAY IGA/IGD/IGG/IGM EACH: CPT

## 2024-09-24 PROCEDURE — 85025 COMPLETE CBC W/AUTO DIFF WBC: CPT

## 2024-09-24 PROCEDURE — 36415 COLL VENOUS BLD VENIPUNCTURE: CPT

## 2024-09-24 PROCEDURE — 80053 COMPREHEN METABOLIC PANEL: CPT

## 2024-09-26 ENCOUNTER — TELEPHONE (OUTPATIENT)
Dept: PRIMARY CARE | Facility: CLINIC | Age: 44
End: 2024-09-26

## 2024-09-26 LAB
IGG SERPL-MCNC: 972 MG/DL (ref 603–1613)
IMMUNOGLOBULIN G: 972 MG/DL (ref 603–1613)

## 2024-09-26 NOTE — TELEPHONE ENCOUNTER
Does not look like patient is seen here anymore.   Left message for patient to call back to confirm

## 2024-09-26 NOTE — TELEPHONE ENCOUNTER
Patient has not been seen in 2 years, is he still our patient? Received labs on him, he is overdue for CPE and close to becoming an inactive patient in the practice

## 2025-01-21 ENCOUNTER — HOSPITAL ENCOUNTER (OUTPATIENT)
Dept: HOSPITAL 100 - LAB | Age: 45
Discharge: HOME | End: 2025-01-21
Payer: COMMERCIAL

## 2025-01-21 DIAGNOSIS — Z79.899: ICD-10-CM

## 2025-01-21 DIAGNOSIS — G35: Primary | ICD-10-CM

## 2025-01-21 LAB
ALANINE AMINOTRANSFER ALT/SGPT: 20 U/L (ref 16–61)
ALBUMIN SERPL-MCNC: 3.8 G/DL (ref 3.2–5)
ALKALINE PHOSPHATASE: 61 U/L (ref 45–117)
ANION GAP: 8 (ref 5–15)
AST(SGOT): 15 U/L (ref 15–37)
BUN SERPL-MCNC: 11 MG/DL (ref 7–18)
BUN/CREAT RATIO: 12 RATIO (ref 10–20)
CALCIUM SERPL-MCNC: 9.4 MG/DL (ref 8.5–10.1)
CARBON DIOXIDE: 28 MMOL/L (ref 21–32)
CHLORIDE: 103 MMOL/L (ref 98–107)
DEPRECATED RDW RBC: 43.6 FL (ref 35.1–43.9)
ERYTHROCYTE [DISTWIDTH] IN BLOOD: 12.8 % (ref 11.6–14.6)
EST GLOM FILT RATE - AFR AMER: 114 ML/MIN (ref 60–?)
GLOBULIN: 3.3 G/DL (ref 2.2–4.2)
GLUCOSE: 97 MG/DL (ref 74–106)
HCT VFR BLD AUTO: 41.1 % (ref 40–54)
HEMOGLOBIN: 14 G/DL (ref 13–16.5)
HGB BLD-MCNC: 14 G/DL (ref 13–16.5)
IMMATURE GRANULOCYTES COUNT: 0.03 X10^3/UL (ref 0–0)
MCV RBC: 92.6 FL (ref 80–94)
MEAN CORP HGB CONC: 34.1 G/DL (ref 32–36)
MEAN PLATELET VOL.: 9.2 FL (ref 6.2–12)
NRBC FLAGGED BY ANALYZER: 0 % (ref 0–5)
PLATELET # BLD: 406 K/MM3 (ref 150–450)
PLATELET COUNT: 406 K/MM3 (ref 150–450)
POTASSIUM: 3.9 MMOL/L (ref 3.5–5.1)
RBC # BLD AUTO: 4.44 M/MM3 (ref 4.6–6.2)
RBC DISTRIBUTION WIDTH CV: 12.8 % (ref 11.6–14.6)
RBC DISTRIBUTION WIDTH SD: 43.6 FL (ref 35.1–43.9)
WBC # BLD AUTO: 6.6 K/MM3 (ref 4.4–11)
WHITE BLOOD COUNT: 6.6 K/MM3 (ref 4.4–11)

## 2025-01-21 PROCEDURE — 82784 ASSAY IGA/IGD/IGG/IGM EACH: CPT

## 2025-01-21 PROCEDURE — 36415 COLL VENOUS BLD VENIPUNCTURE: CPT

## 2025-01-21 PROCEDURE — 85025 COMPLETE CBC W/AUTO DIFF WBC: CPT

## 2025-01-21 PROCEDURE — 80053 COMPREHEN METABOLIC PANEL: CPT

## 2025-01-23 LAB
IGG SERPL-MCNC: 948 MG/DL (ref 603–1613)
IMMUNOGLOBULIN G: 948 MG/DL (ref 603–1613)

## 2025-07-22 ENCOUNTER — HOSPITAL ENCOUNTER (OUTPATIENT)
Dept: HOSPITAL 100 - LAB | Age: 45
Discharge: HOME | End: 2025-07-22
Payer: COMMERCIAL

## 2025-07-22 DIAGNOSIS — Z79.899: ICD-10-CM

## 2025-07-22 DIAGNOSIS — G35: Primary | ICD-10-CM

## 2025-07-22 LAB
ABSOLUTE NEUTROPHIL COUNT: 4.3 X10^3/UL (ref 2–7.7)
ALBUMIN SERPL-MCNC: 4.3 G/DL (ref 3.5–5)
ALBUMIN/GLOB SERPL: 1.8 RATIO (ref 0.9–2.4)
ALP SERPL-CCNC: 53 U/L (ref 40–129)
ALT SERPL-CCNC: 13 U/L (ref ?–46)
ANION GAP SERPL CALC-SCNC: 12 MMOL/L (ref 5–15)
ANISOCYTOSIS BLD QL SMEAR: (no result)
AST(SGOT): 22 U/L (ref ?–37)
BASO STIPL BLD QL SMEAR: (no result)
BASOPHIL#: 0.09 X10^3/UL
BASOPHIL%: 1.3 % (ref 0–1)
BASOPHILS NFR SPEC MANUAL: (no result) % (ref 0–1)
BITE CELLS BLD QL SMEAR: (no result)
BUN SERPL-MCNC: 13 MG/DL (ref 4–19)
BUN/CREAT SERPL: 14.1 RATIO (ref 10–20)
BURR CELLS BLD QL SMEAR: (no result)
CALCIUM SERPL-MCNC: 9.5 MG/DL (ref 7.6–11)
CHLORIDE: 105 MMOL/L (ref 98–108)
CO2 BLDCV-SCNC: 22.3 MMOL/L (ref 21–32)
CREAT SERPL-MCNC: 0.92 MG/DL (ref 0.7–1.2)
DEPRECATED RDW RBC: 44.2 FL (ref 35.1–43.9)
DOHLE BOD BLD QL SMEAR: (no result)
EOSINOPHIL # BLD: 0.25 X10^3/UL
ERYTHROCYTE [DISTWIDTH] IN BLOOD: 12.8 % (ref 11.6–14.6)
GLOBULIN: 2.4 G/DL (ref 2.2–4.2)
GLUCOSE SERPL-MCNC: 100 MG/DL (ref 70–99)
HCT VFR BLD AUTO: 39.8 % (ref 40–54)
HGB BLD-MCNC: 13.6 G/DL (ref 13–16.5)
HOWELL-JOLLY BOD BLD QL SMEAR: (no result)
HYPOCHROMIA BLD QL: (no result)
IMM GRANULOCYTES NFR BLD AUTO: 0.4 % (ref 0–0.9)
IMMATURE GRANULOCYTES COUNT: 0.03 X10^3/UL (ref 0–0)
LYMPHOCYTES # SPEC AUTO: 1.81 X10^3/UL (ref 0.83–4.51)
LYMPHOCYTES # SPEC AUTO: 1.81 X10^3/UL (ref 0.83–4.51)
LYMPHOCYTES NFR SPEC AUTO: 25.4 % (ref 19–41)
Lab: 3.5 % (ref 0–5)
MACROCYTES BLD QL: (no result)
MANUAL DIF COMMENT BLD-IMP: (no result)
MCH RBC QN AUTO: 31.9 PG (ref 27–32)
MCV RBC: 93.4 FL (ref 80–94)
MEAN CORP HGB CONC: 34.2 G/DL (ref 32–36)
MEAN PLATELET VOL.: 9.6 FL (ref 6.2–12)
MONOCYTE#: 0.64 X10^3/UL
MONOCYTE%: 9 % (ref 0–10)
NEUTROPHIL #: 4.32 X10^3/UL (ref 2.7–7.7)
NEUTROPHILS NFR BLD AUTO: 60.4 % (ref 47–70)
NEUTS HYPERSEG # BLD: (no result) 10*3/UL
NRBC FLAGGED BY ANALYZER: 0 % (ref 0–5)
PLATELET # BLD: 309 K/MM3 (ref 150–450)
POLYCHROMASIA BLD QL SMEAR: (no result)
POTASSIUM SPEC-MCNC: 4.1 MMOL/L (ref 3.3–5.1)
PROTEIN, TOTAL: 6.7 G/DL (ref 5.9–8.4)
RBC # BLD AUTO: 4.26 M/MM3 (ref 4.6–6.2)
SODIUM LEVEL: 139 MMOL/L (ref 133–145)
TOTAL BILIRUBIN: 0.49 MG/DL (ref 0–1.3)
WBC # BLD AUTO: 7.1 K/MM3 (ref 4.4–11)
WBC NRBC COR # BLD: (no result) K/MM3 (ref 4.4–11)

## 2025-07-22 PROCEDURE — 36415 COLL VENOUS BLD VENIPUNCTURE: CPT

## 2025-07-22 PROCEDURE — 80053 COMPREHEN METABOLIC PANEL: CPT

## 2025-07-22 PROCEDURE — 85025 COMPLETE CBC W/AUTO DIFF WBC: CPT

## 2025-07-22 PROCEDURE — 82784 ASSAY IGA/IGD/IGG/IGM EACH: CPT

## 2025-07-22 PROCEDURE — 82785 ASSAY OF IGE: CPT

## 2025-07-28 LAB
IGA SERPL-MCNC: 117 MG/DL (ref 90–386)
IGE SERPL-ACNC: 3 IU/ML (ref 6–495)
IGG SERPL-MCNC: 898 MG/DL (ref 603–1613)
IGM SERPL-MCNC: 89 MG/DL (ref 20–172)